# Patient Record
Sex: FEMALE | Race: WHITE
[De-identification: names, ages, dates, MRNs, and addresses within clinical notes are randomized per-mention and may not be internally consistent; named-entity substitution may affect disease eponyms.]

---

## 2019-12-05 ENCOUNTER — HOSPITAL ENCOUNTER (OUTPATIENT)
Dept: HOSPITAL 73 - RAD | Age: 59
Discharge: HOME | End: 2019-12-05
Payer: COMMERCIAL

## 2019-12-05 VITALS
DIASTOLIC BLOOD PRESSURE: 87 MMHG | OXYGEN SATURATION: 98 % | SYSTOLIC BLOOD PRESSURE: 128 MMHG | RESPIRATION RATE: 16 BRPM | HEART RATE: 67 BPM

## 2019-12-05 VITALS
DIASTOLIC BLOOD PRESSURE: 85 MMHG | SYSTOLIC BLOOD PRESSURE: 129 MMHG | HEART RATE: 88 BPM | OXYGEN SATURATION: 96 % | RESPIRATION RATE: 16 BRPM

## 2019-12-05 VITALS
TEMPERATURE: 97 F | HEART RATE: 67 BPM | DIASTOLIC BLOOD PRESSURE: 74 MMHG | OXYGEN SATURATION: 99 % | RESPIRATION RATE: 16 BRPM | SYSTOLIC BLOOD PRESSURE: 107 MMHG

## 2019-12-05 VITALS
DIASTOLIC BLOOD PRESSURE: 64 MMHG | SYSTOLIC BLOOD PRESSURE: 109 MMHG | RESPIRATION RATE: 16 BRPM | HEART RATE: 66 BPM | OXYGEN SATURATION: 98 %

## 2019-12-05 DIAGNOSIS — M47.812: Primary | ICD-10-CM

## 2019-12-05 DIAGNOSIS — M54.5: ICD-10-CM

## 2019-12-05 PROCEDURE — 64490 INJ PARAVERT F JNT C/T 1 LEV: CPT

## 2019-12-05 PROCEDURE — 64491 INJ PARAVERT F JNT C/T 2 LEV: CPT

## 2019-12-05 PROCEDURE — 99152 MOD SED SAME PHYS/QHP 5/>YRS: CPT

## 2019-12-05 RX ADMIN — DEXAMETHASONE SODIUM PHOSPHATE 30 MG: 10 INJECTION INTRAMUSCULAR; INTRAVENOUS at 09:35

## 2019-12-05 RX ADMIN — BUPIVACAINE HYDROCHLORIDE 2 ML: 5 INJECTION, SOLUTION EPIDURAL; INTRACAUDAL; PERINEURAL at 09:35

## 2019-12-05 RX ADMIN — FENTANYL CITRATE 50 MCG: 50 INJECTION, SOLUTION INTRAMUSCULAR; INTRAVENOUS at 09:21

## 2019-12-05 RX ADMIN — IOPAMIDOL 3 ML: 612 INJECTION, SOLUTION INTRATHECAL at 09:35

## 2019-12-05 RX ADMIN — MIDAZOLAM HYDROCHLORIDE 5 MG: 1 INJECTION, SOLUTION INTRAMUSCULAR; INTRAVENOUS at 09:21

## 2019-12-05 NOTE — P.PCN_ITS
Procedures    
Date/Time    
Date of procedure: 12/05/19    
Time of procedure: 09:41    
General    
Procedure description:     
PREOP DIAGNOSIS    
1.  FACET ARTHROPATHY     
2.  AXIAL NECK PAIN    
    
POST OP DIAGNOSIS    
1.  FACET ARTHROPATHY     
2.  AXIAL NECK PAIN    
    
PROCEDURES    
1.  FLUOROSCOPICALLY GUIDED, CONTRAST-CONTROLLED RIGHT C5/6 AND C6/7 FACET JOINT  
INJECTIONS WITH CONSCIOUS SEDATION.    
    
PHYSICIAN: Lester Ludwig, DO    
    
INDICATIONS    
Carolina is referred by Dr. Joe for treatment of Axial Neck Pain    
    
DESCRIPTION OF PROCEDURE    
Fluoroscopically guided, contrast-controlled right C5/6 and C6/7 facet joint   
injections with conscious sedation.     
    
Following review of allergy and review of potential side effects and   
complications, including, but not necessarily limited to, infection, allergic   
reaction, local tissue breakdown, stroke, temporary or permanent nerve injury   
and paralysis, the patient indicated that the patient understood and agreed to   
proceed.  An informed consent document was signed by the patient, witnessed by a  
nurse, and placed in the patient's chart.  Additionally, other treatment options  
including medications, modalities, and physical therapy were reviewed with the   
patient.      
    
After review of previous anaesthesic history and IV conscious sedation the tamara  
ent was deemed safe to proceed with todays procedure with IV conscious sedation   
as ASA class II designation. Safety time-out was performed to confirm patient   
ID, procedure to be performed and site of procedure. IV sedation was   
accomplished with a combination of 2mg of Versed and 50mcg of Fentanyl was   
administered by the RN after DO order, titrated to patient comfort during the   
course of the procedure while the patient remained responsive to all verbal   
commands    
    
In the prone position, following sterile prep and drape of the cervical spine   
region, the posterior aspect of the right C5/6 and C6/7 facet joints were   
identified fluoroscopically.  The skin was anesthetized via a 25-gauge 1.5-inch   
needle with 1% lidocaine solution into the corresponding facet joints.  At this   
point, a 25-gauge 2.5-inch spinal needle was atraumatically introduced and   
advanced under fluoroscopic guidance into the corresponding facet joints.    
Following negative aspiration, injections of approximately 0.2-cc of Isovue 200   
confirmed interarticular placement without vascular uptake.      
    
At this point, a total of 1 cc including 0.5 cc or 5 mg of dexamethasone   
combined with 0.5 cc of 1% lidocaine solution was injected without complication   
into each of the corresponding facet joints.      
    
The procedure tolerated the procedure well without signs or symptoms of   
complications prior to transfer to the recovery area continued monitoring   
without incident.      
    
The patient was then transferred to the recovery area where they were observed   
for an appropriate period of time after the injection.      
    
The patient reported a VAS score of 7 prior to the procedure and a post-  
procedure VAS of 0.      
    
Total Fluoroscopy Time:     20.5 seconds    
Total Conscious Sedation Time: 24 min    
    
POST OP INSTRUCTIONS    
They were provided a Pain Log to continue to record their response to the   
target-specific procedure prior to their follow-up visit with their referring   
physician.  Additionally, specific post-injection care instructions and a co  
ntact number to our office were provided if concerns arise regarding possible   
complications associated with the procedure are suspected.    
    
     
Lester Ludwig DO    
    
Complications: none

## 2019-12-05 NOTE — PC.NURSE
late entry post procedure note:  Time out at 0920.  patient medicated per providers orders.  patient tolerated procedure well.  VSS and O2 Sat wnl on RA.  Able to sit up and transfer to w/c with standby assist.  Handoff report given to JOHN Jaimes RN 
at 0837.  Pain level 0/10

## 2019-12-05 NOTE — PC.NURSE
DI note:
Received patient post procedure, awake, alert and calm. VSS. Ambulated with stand by assist from WC to chair.

## 2022-09-12 ENCOUNTER — HOSPITAL ENCOUNTER (OUTPATIENT)
Age: 62
End: 2022-09-12
Payer: COMMERCIAL

## 2022-09-12 DIAGNOSIS — M48.02: ICD-10-CM

## 2022-09-12 DIAGNOSIS — M50.00: ICD-10-CM

## 2022-09-12 DIAGNOSIS — M47.812: Primary | ICD-10-CM

## 2022-09-12 PROCEDURE — 72050 X-RAY EXAM NECK SPINE 4/5VWS: CPT

## 2022-10-12 NOTE — PM.PROC.1
Procedures
Date/Time
Date of procedure: 12/05/19
Time of procedure: 09:41
General
Procedure description: 
PREOP DIAGNOSIS
1.  FACET ARTHROPATHY 
2.  AXIAL NECK PAIN

POST OP DIAGNOSIS
1.  FACET ARTHROPATHY 
2.  AXIAL NECK PAIN

PROCEDURES
1.  FLUOROSCOPICALLY GUIDED, CONTRAST-CONTROLLED RIGHT C5/6 AND C6/7 FACET JOINT INJECTIONS WITH CONSCIOUS SEDATION.

PHYSICIAN: Lester Ludwig, DO BENJAMIN
[place] is referred by Dr. Joe for treatment of Axial Neck Pain

DESCRIPTION OF PROCEDURE
Fluoroscopically guided, contrast-controlled right C5/6 and C6/7 facet joint injections with conscious sedation. 

Following review of allergy and review of potential side effects and complications, including, but not necessarily limited to, infection, allergic reaction, local tissue breakdown, stroke, temporary or permanent nerve injury and paralysis, the 
patient indicated that the patient understood and agreed to proceed.  An informed consent document was signed by the patient, witnessed by a nurse, and placed in the patient's chart.  Additionally, other treatment options including medications, 
modalities, and physical therapy were reviewed with the patient.  

After review of previous anaesthesic history and IV conscious sedation the patient was deemed safe to proceed with todays procedure with IV conscious sedation as ASA class II designation. Safety time-out was performed to confirm patient ID, 
procedure to be performed and site of procedure. IV sedation was accomplished with a combination of 2mg of Versed and 50mcg of Fentanyl was administered by the RN after DO order, titrated to patient comfort during the course of the procedure while 
the patient remained responsive to all verbal commands

In the prone position, following sterile prep and drape of the cervical spine region, the posterior aspect of the right C5/6 and C6/7 facet joints were identified fluoroscopically.  The skin was anesthetized via a 25-gauge 1.5-inch needle with 1% 
lidocaine solution into the corresponding facet joints.  At this point, a 25-gauge 2.5-inch spinal needle was atraumatically introduced and advanced under fluoroscopic guidance into the corresponding facet joints.  Following negative aspiration, 
injections of approximately 0.2-cc of Isovue 200 confirmed interarticular placement without vascular uptake.  

At this point, a total of 1 cc including 0.5 cc or 5 mg of dexamethasone combined with 0.5 cc of 1% lidocaine solution was injected without complication into each of the corresponding facet joints.  

The procedure tolerated the procedure well without signs or symptoms of complications prior to transfer to the recovery area continued monitoring without incident.  

The patient was then transferred to the recovery area where they were observed for an appropriate period of time after the injection.  

The patient reported a VAS score of 7 prior to the procedure and a post-procedure VAS of 0.  

Total Fluoroscopy Time:     20.5 seconds
Total Conscious Sedation Time: 24 min

POST OP INSTRUCTIONS
They were provided a Pain Log to continue to record their response to the target-specific procedure prior to their follow-up visit with their referring physician.  Additionally, specific post-injection care instructions and a contact number to our 
office were provided if concerns arise regarding possible complications associated with the procedure are suspected.

 
Lester Ludwig, 

Complications: none 10-Oct-2022

## 2022-10-27 ENCOUNTER — HOSPITAL ENCOUNTER (OUTPATIENT)
Dept: HOSPITAL 73 - RAD | Age: 62
Discharge: HOME | End: 2022-10-27
Payer: COMMERCIAL

## 2022-10-27 VITALS
OXYGEN SATURATION: 96 % | RESPIRATION RATE: 17 BRPM | SYSTOLIC BLOOD PRESSURE: 121 MMHG | HEART RATE: 67 BPM | DIASTOLIC BLOOD PRESSURE: 65 MMHG

## 2022-10-27 VITALS
TEMPERATURE: 97.52 F | HEART RATE: 69 BPM | RESPIRATION RATE: 18 BRPM | OXYGEN SATURATION: 97 % | SYSTOLIC BLOOD PRESSURE: 118 MMHG | DIASTOLIC BLOOD PRESSURE: 72 MMHG

## 2022-10-27 VITALS
DIASTOLIC BLOOD PRESSURE: 69 MMHG | OXYGEN SATURATION: 98 % | SYSTOLIC BLOOD PRESSURE: 127 MMHG | RESPIRATION RATE: 18 BRPM | HEART RATE: 77 BPM

## 2022-10-27 VITALS
RESPIRATION RATE: 18 BRPM | DIASTOLIC BLOOD PRESSURE: 78 MMHG | OXYGEN SATURATION: 100 % | SYSTOLIC BLOOD PRESSURE: 129 MMHG | HEART RATE: 67 BPM

## 2022-10-27 VITALS
OXYGEN SATURATION: 97 % | RESPIRATION RATE: 22 BRPM | DIASTOLIC BLOOD PRESSURE: 64 MMHG | HEART RATE: 72 BPM | SYSTOLIC BLOOD PRESSURE: 112 MMHG

## 2022-10-27 VITALS
RESPIRATION RATE: 17 BRPM | OXYGEN SATURATION: 96 % | DIASTOLIC BLOOD PRESSURE: 64 MMHG | HEART RATE: 65 BPM | SYSTOLIC BLOOD PRESSURE: 106 MMHG

## 2022-10-27 VITALS
RESPIRATION RATE: 20 BRPM | SYSTOLIC BLOOD PRESSURE: 114 MMHG | HEART RATE: 64 BPM | OXYGEN SATURATION: 99 % | DIASTOLIC BLOOD PRESSURE: 69 MMHG

## 2022-10-27 VITALS
SYSTOLIC BLOOD PRESSURE: 116 MMHG | DIASTOLIC BLOOD PRESSURE: 70 MMHG | RESPIRATION RATE: 18 BRPM | HEART RATE: 64 BPM | OXYGEN SATURATION: 98 %

## 2022-10-27 VITALS
OXYGEN SATURATION: 99 % | RESPIRATION RATE: 19 BRPM | DIASTOLIC BLOOD PRESSURE: 74 MMHG | SYSTOLIC BLOOD PRESSURE: 121 MMHG | HEART RATE: 66 BPM

## 2022-10-27 DIAGNOSIS — M47.812: Primary | ICD-10-CM

## 2022-10-27 PROCEDURE — 64490 INJ PARAVERT F JNT C/T 1 LEV: CPT

## 2022-10-27 PROCEDURE — 99152 MOD SED SAME PHYS/QHP 5/>YRS: CPT

## 2022-10-27 PROCEDURE — 64491 INJ PARAVERT F JNT C/T 2 LEV: CPT

## 2022-10-27 RX ADMIN — IOPAMIDOL 3 ML: 612 INJECTION, SOLUTION INTRATHECAL at 09:43

## 2022-10-27 RX ADMIN — BUPIVACAINE HYDROCHLORIDE 2 ML: 5 INJECTION, SOLUTION EPIDURAL; INTRACAUDAL; PERINEURAL at 09:43

## 2022-10-27 RX ADMIN — MIDAZOLAM HYDROCHLORIDE 2 MG: 1 INJECTION, SOLUTION INTRAMUSCULAR; INTRAVENOUS at 09:39

## 2022-10-27 RX ADMIN — DEXAMETHASONE SODIUM PHOSPHATE 20 MG: 10 INJECTION INTRAMUSCULAR; INTRAVENOUS at 09:43

## 2023-03-02 ENCOUNTER — HOSPITAL ENCOUNTER (OUTPATIENT)
Age: 63
Discharge: HOME | End: 2023-03-02
Payer: COMMERCIAL

## 2023-03-02 VITALS
OXYGEN SATURATION: 98 % | SYSTOLIC BLOOD PRESSURE: 124 MMHG | DIASTOLIC BLOOD PRESSURE: 63 MMHG | RESPIRATION RATE: 17 BRPM | HEART RATE: 73 BPM

## 2023-03-02 VITALS
SYSTOLIC BLOOD PRESSURE: 116 MMHG | TEMPERATURE: 97.9 F | RESPIRATION RATE: 18 BRPM | HEART RATE: 76 BPM | DIASTOLIC BLOOD PRESSURE: 76 MMHG | OXYGEN SATURATION: 96 %

## 2023-03-02 VITALS
RESPIRATION RATE: 18 BRPM | SYSTOLIC BLOOD PRESSURE: 128 MMHG | DIASTOLIC BLOOD PRESSURE: 63 MMHG | OXYGEN SATURATION: 99 % | HEART RATE: 77 BPM

## 2023-03-02 VITALS
SYSTOLIC BLOOD PRESSURE: 126 MMHG | RESPIRATION RATE: 18 BRPM | DIASTOLIC BLOOD PRESSURE: 66 MMHG | OXYGEN SATURATION: 99 % | HEART RATE: 77 BPM

## 2023-03-02 VITALS
SYSTOLIC BLOOD PRESSURE: 116 MMHG | RESPIRATION RATE: 18 BRPM | HEART RATE: 80 BPM | DIASTOLIC BLOOD PRESSURE: 69 MMHG | OXYGEN SATURATION: 100 %

## 2023-03-02 VITALS
SYSTOLIC BLOOD PRESSURE: 118 MMHG | DIASTOLIC BLOOD PRESSURE: 67 MMHG | RESPIRATION RATE: 16 BRPM | OXYGEN SATURATION: 98 % | HEART RATE: 76 BPM

## 2023-03-02 VITALS
RESPIRATION RATE: 11 BRPM | DIASTOLIC BLOOD PRESSURE: 68 MMHG | HEART RATE: 75 BPM | SYSTOLIC BLOOD PRESSURE: 127 MMHG | OXYGEN SATURATION: 100 %

## 2023-03-02 DIAGNOSIS — M47.812: Primary | ICD-10-CM

## 2023-03-02 PROCEDURE — 64490 INJ PARAVERT F JNT C/T 1 LEV: CPT

## 2023-03-02 PROCEDURE — 99152 MOD SED SAME PHYS/QHP 5/>YRS: CPT

## 2023-06-01 ENCOUNTER — HOSPITAL ENCOUNTER (OUTPATIENT)
Age: 63
Discharge: HOME | End: 2023-06-01
Payer: COMMERCIAL

## 2023-06-01 VITALS
DIASTOLIC BLOOD PRESSURE: 53 MMHG | HEART RATE: 63 BPM | RESPIRATION RATE: 18 BRPM | SYSTOLIC BLOOD PRESSURE: 101 MMHG | OXYGEN SATURATION: 99 %

## 2023-06-01 VITALS
HEART RATE: 71 BPM | DIASTOLIC BLOOD PRESSURE: 63 MMHG | RESPIRATION RATE: 21 BRPM | OXYGEN SATURATION: 97 % | SYSTOLIC BLOOD PRESSURE: 138 MMHG

## 2023-06-01 VITALS
DIASTOLIC BLOOD PRESSURE: 59 MMHG | SYSTOLIC BLOOD PRESSURE: 107 MMHG | HEART RATE: 63 BPM | RESPIRATION RATE: 17 BRPM | OXYGEN SATURATION: 100 %

## 2023-06-01 VITALS
SYSTOLIC BLOOD PRESSURE: 133 MMHG | HEART RATE: 75 BPM | OXYGEN SATURATION: 97 % | RESPIRATION RATE: 19 BRPM | DIASTOLIC BLOOD PRESSURE: 63 MMHG

## 2023-06-01 VITALS
DIASTOLIC BLOOD PRESSURE: 57 MMHG | OXYGEN SATURATION: 100 % | SYSTOLIC BLOOD PRESSURE: 102 MMHG | HEART RATE: 65 BPM | RESPIRATION RATE: 18 BRPM

## 2023-06-01 VITALS
RESPIRATION RATE: 19 BRPM | SYSTOLIC BLOOD PRESSURE: 110 MMHG | HEART RATE: 68 BPM | DIASTOLIC BLOOD PRESSURE: 60 MMHG | OXYGEN SATURATION: 98 %

## 2023-06-01 VITALS
RESPIRATION RATE: 18 BRPM | HEART RATE: 65 BPM | DIASTOLIC BLOOD PRESSURE: 53 MMHG | SYSTOLIC BLOOD PRESSURE: 114 MMHG | OXYGEN SATURATION: 98 %

## 2023-06-01 VITALS
SYSTOLIC BLOOD PRESSURE: 134 MMHG | OXYGEN SATURATION: 99 % | HEART RATE: 63 BPM | DIASTOLIC BLOOD PRESSURE: 67 MMHG | RESPIRATION RATE: 18 BRPM

## 2023-06-01 VITALS
DIASTOLIC BLOOD PRESSURE: 60 MMHG | RESPIRATION RATE: 17 BRPM | OXYGEN SATURATION: 100 % | HEART RATE: 72 BPM | SYSTOLIC BLOOD PRESSURE: 102 MMHG

## 2023-06-01 DIAGNOSIS — M48.02: Primary | ICD-10-CM

## 2023-06-01 DIAGNOSIS — M47.22: ICD-10-CM

## 2023-06-01 PROCEDURE — 99152 MOD SED SAME PHYS/QHP 5/>YRS: CPT

## 2023-06-01 PROCEDURE — 62321 NJX INTERLAMINAR CRV/THRC: CPT

## 2023-08-14 ENCOUNTER — HOSPITAL ENCOUNTER (OUTPATIENT)
Age: 63
End: 2023-08-14
Payer: COMMERCIAL

## 2023-08-14 DIAGNOSIS — M48.02: ICD-10-CM

## 2023-08-14 DIAGNOSIS — M50.30: ICD-10-CM

## 2023-08-14 DIAGNOSIS — M47.812: Primary | ICD-10-CM

## 2023-08-14 DIAGNOSIS — M43.12: ICD-10-CM

## 2023-08-14 PROCEDURE — 72050 X-RAY EXAM NECK SPINE 4/5VWS: CPT
